# Patient Record
Sex: FEMALE | Race: ASIAN | URBAN - METROPOLITAN AREA
[De-identification: names, ages, dates, MRNs, and addresses within clinical notes are randomized per-mention and may not be internally consistent; named-entity substitution may affect disease eponyms.]

---

## 2022-12-15 ENCOUNTER — OFFICE VISIT (OUTPATIENT)
Dept: URBAN - METROPOLITAN AREA CLINIC 28 | Facility: CLINIC | Age: 52
End: 2022-12-15
Payer: COMMERCIAL

## 2022-12-15 DIAGNOSIS — H52.13 MYOPIA, BILATERAL: ICD-10-CM

## 2022-12-15 DIAGNOSIS — H18.612 KERATOCONUS, STABLE, LEFT EYE: Primary | ICD-10-CM

## 2022-12-15 PROCEDURE — 92310 CONTACT LENS FITTING OU: CPT | Performed by: OPTOMETRIST

## 2022-12-15 PROCEDURE — 92002 INTRM OPH EXAM NEW PATIENT: CPT | Performed by: OPTOMETRIST

## 2022-12-15 RX ORDER — SODIUM CHLORIDE FOR INHALATION 0.9 %
0.9 % VIAL, NEBULIZER (ML) INHALATION
Qty: 300 | Refills: 2 | Status: ACTIVE
Start: 2022-12-15

## 2022-12-15 ASSESSMENT — VISUAL ACUITY
OD: 20/20
OS: 20/40

## 2022-12-15 ASSESSMENT — KERATOMETRY
OD: 44.75
OS: 49.88

## 2022-12-15 NOTE — IMPRESSION/PLAN
Impression: Keratoconus, stable, left eye: H18.612. Plan: Educated on exam findings. Scleral lens OS medically necessary. Ordering a new lens OS. Pt to return for CL f/u 1-2 weeks after dispense.

## 2023-02-09 ENCOUNTER — OFFICE VISIT (OUTPATIENT)
Dept: URBAN - METROPOLITAN AREA CLINIC 28 | Facility: CLINIC | Age: 53
End: 2023-02-09

## 2023-02-09 DIAGNOSIS — H18.612 KERATOCONUS, STABLE, LEFT EYE: Primary | ICD-10-CM

## 2023-02-09 DIAGNOSIS — H52.13 MYOPIA, BILATERAL: ICD-10-CM

## 2023-02-09 PROCEDURE — 92310 CONTACT LENS FITTING OU: CPT | Performed by: OPTOMETRIST

## 2023-02-09 PROCEDURE — V2799 MISC VISION ITEM OR SERVICE: HCPCS | Performed by: OPTOMETRIST

## 2023-02-09 NOTE — IMPRESSION/PLAN
Impression: Keratoconus, stable, left eye: H18.612. Plan: Educated on exam findings. Scleral lens OS medically necessary. Ordering a replacement (increased sag over cone and with -0.50DS OR - so in a -0.75DS) lens OS from [employer]. Pt to return for CL f/u at dispense.

## 2023-03-10 ENCOUNTER — OFFICE VISIT (OUTPATIENT)
Dept: URBAN - METROPOLITAN AREA CLINIC 28 | Facility: CLINIC | Age: 53
End: 2023-03-10

## 2023-03-10 DIAGNOSIS — H18.612 KERATOCONUS, STABLE, LEFT EYE: Primary | ICD-10-CM

## 2023-03-10 DIAGNOSIS — H52.13 MYOPIA, BILATERAL: ICD-10-CM

## 2023-03-10 PROCEDURE — 92310 CONTACT LENS FITTING OU: CPT | Performed by: OPTOMETRIST

## 2023-03-10 NOTE — IMPRESSION/PLAN
Impression: Keratoconus, stable, left eye: H18.612. Plan: Educated on exam findings. Scleral lens OS medically necessary. Updated CL Rx given for full time wear. Monitor annually.

## 2024-02-19 ENCOUNTER — OFFICE VISIT (OUTPATIENT)
Dept: URBAN - METROPOLITAN AREA CLINIC 28 | Facility: CLINIC | Age: 54
End: 2024-02-19
Payer: COMMERCIAL

## 2024-02-19 DIAGNOSIS — H18.612 KERATOCONUS, STABLE, LEFT EYE: Primary | ICD-10-CM

## 2024-02-19 DIAGNOSIS — H52.13 MYOPIA, BILATERAL: ICD-10-CM

## 2024-02-19 PROCEDURE — 92012 INTRM OPH EXAM EST PATIENT: CPT | Performed by: OPTOMETRIST

## 2024-02-19 PROCEDURE — 92310 CONTACT LENS FITTING OU: CPT | Performed by: OPTOMETRIST

## 2024-02-19 ASSESSMENT — VISUAL ACUITY
OD: 20/20
OS: 20/20

## 2024-02-19 ASSESSMENT — INTRAOCULAR PRESSURE
OD: 16
OS: 17

## 2024-02-19 ASSESSMENT — KERATOMETRY
OS: 45.88
OD: 44.88

## 2025-01-30 ENCOUNTER — OFFICE VISIT (OUTPATIENT)
Dept: URBAN - METROPOLITAN AREA CLINIC 28 | Facility: CLINIC | Age: 55
End: 2025-01-30
Payer: COMMERCIAL

## 2025-01-30 DIAGNOSIS — H52.13 MYOPIA, BILATERAL: ICD-10-CM

## 2025-01-30 DIAGNOSIS — H18.612 KERATOCONUS, STABLE, LEFT EYE: Primary | ICD-10-CM

## 2025-01-30 PROCEDURE — 92012 INTRM OPH EXAM EST PATIENT: CPT | Performed by: OPTOMETRIST

## 2025-01-30 ASSESSMENT — INTRAOCULAR PRESSURE
OD: 16
OS: 15

## 2025-01-30 ASSESSMENT — VISUAL ACUITY
OS: 20/30
OD: 20/20

## 2025-04-11 ENCOUNTER — OFFICE VISIT (OUTPATIENT)
Dept: URBAN - METROPOLITAN AREA CLINIC 28 | Facility: CLINIC | Age: 55
End: 2025-04-11

## 2025-04-11 DIAGNOSIS — H52.13 MYOPIA, BILATERAL: ICD-10-CM

## 2025-04-11 DIAGNOSIS — H18.612 KERATOCONUS, STABLE, LEFT EYE: Primary | ICD-10-CM

## 2025-04-11 PROCEDURE — 92310 CONTACT LENS FITTING OU: CPT | Performed by: OPTOMETRIST

## 2025-04-11 RX ORDER — SODIUM CHLORIDE FOR INHALATION 0.9 %
0.9 % VIAL, NEBULIZER (ML) INHALATION
Qty: 300 | Refills: 0 | Status: INACTIVE
Start: 2025-04-11 | End: 2025-04-11

## 2025-04-11 RX ORDER — SODIUM CHLORIDE FOR INHALATION 0.9 %
0.9 % VIAL, NEBULIZER (ML) INHALATION
Qty: 300 | Refills: 0 | Status: ACTIVE
Start: 2025-04-11